# Patient Record
Sex: FEMALE | Race: BLACK OR AFRICAN AMERICAN | NOT HISPANIC OR LATINO | Employment: OTHER | URBAN - METROPOLITAN AREA
[De-identification: names, ages, dates, MRNs, and addresses within clinical notes are randomized per-mention and may not be internally consistent; named-entity substitution may affect disease eponyms.]

---

## 2020-05-22 ENCOUNTER — TELEPHONE (OUTPATIENT)
Dept: ORTHOPEDICS | Facility: CLINIC | Age: 60
End: 2020-05-22

## 2020-05-22 NOTE — TELEPHONE ENCOUNTER
----- Message from Diane Myers sent at 5/22/2020 10:04 AM CDT -----  Contact: Joselyn Alves would like to know if the registration paperwork for new patients can be mailed to her to complete. Please call [phone] to discuss.

## 2020-05-22 NOTE — TELEPHONE ENCOUNTER
Placed a call to the patient to reschedule the current appointment with Dr. Little to the [place] location. There was no answer therefore, a voicemail was left.

## 2020-05-22 NOTE — TELEPHONE ENCOUNTER
Placed a return call to the patient. There was no answer at given phone number. A voicemail was left stating that we, the clinic, do not have any paperwork. Everything is online. I stated that if the patient would like to complete her pre-questionnaire and build her chart then she can do so on the [facility]ore. The phone number, [phone], was left for the patient to call back with any other questions.

## 2020-05-25 ENCOUNTER — TELEPHONE (OUTPATIENT)
Dept: ORTHOPEDICS | Facility: CLINIC | Age: 60
End: 2020-05-25

## 2020-05-25 NOTE — TELEPHONE ENCOUNTER
Called patient to reschedule her appointment with Dr. Little due to his clinic days being changed. No answer at patient's phone and left voicemail for patient to call the office at phone number [phone] to reschedule the appointment.     Unable to contact patient X 2 attempts.     [facility] message sent.

## 2020-07-17 ENCOUNTER — HOSPITAL ENCOUNTER (OUTPATIENT)
Dept: RADIOLOGY | Facility: HOSPITAL | Age: 60
Discharge: HOME | End: 2020-07-17
Attending: ORTHOPAEDIC SURGERY
Payer: OTHER GOVERNMENT

## 2020-07-17 ENCOUNTER — OFFICE VISIT (OUTPATIENT)
Dept: ORTHOPEDICS | Facility: CLINIC | Age: 60
End: 2020-07-17
Payer: OTHER GOVERNMENT

## 2020-07-17 VITALS — OXYGEN SATURATION: 99 % | BODY MASS INDEX: 32.96 KG/M2 | HEART RATE: 85 BPM | HEIGHT: 63 IN | WEIGHT: 186 LBS

## 2020-07-17 DIAGNOSIS — M47.816 OSTEOARTHRITIS OF LUMBAR SPINE, UNSPECIFIED SPINAL OSTEOARTHRITIS COMPLICATION STATUS: ICD-10-CM

## 2020-07-17 DIAGNOSIS — M25.552 BILATERAL HIP PAIN: ICD-10-CM

## 2020-07-17 DIAGNOSIS — M70.61 GREATER TROCHANTERIC BURSITIS OF BOTH HIPS: Primary | ICD-10-CM

## 2020-07-17 DIAGNOSIS — M70.62 GREATER TROCHANTERIC BURSITIS OF BOTH HIPS: Primary | ICD-10-CM

## 2020-07-17 DIAGNOSIS — M25.551 BILATERAL HIP PAIN: Primary | ICD-10-CM

## 2020-07-17 DIAGNOSIS — M25.551 BILATERAL HIP PAIN: ICD-10-CM

## 2020-07-17 DIAGNOSIS — M25.552 BILATERAL HIP PAIN: Primary | ICD-10-CM

## 2020-07-17 PROCEDURE — 99204 OFFICE O/P NEW MOD 45 MIN: CPT | Mod: 25,S$GLB,, | Performed by: ORTHOPAEDIC SURGERY

## 2020-07-17 PROCEDURE — 99999 PR PBB SHADOW E&M-EST. PATIENT-LVL III: ICD-10-PCS | Mod: PBBFAC,,, | Performed by: ORTHOPAEDIC SURGERY

## 2020-07-17 PROCEDURE — 20610 DRAIN/INJ JOINT/BURSA W/O US: CPT | Mod: 50,S$GLB,, | Performed by: ORTHOPAEDIC SURGERY

## 2020-07-17 PROCEDURE — 73521 X-RAY EXAM HIPS BI 2 VIEWS: CPT | Mod: 26,,, | Performed by: RADIOLOGY

## 2020-07-17 PROCEDURE — 99999 PR PBB SHADOW E&M-EST. PATIENT-LVL III: CPT | Mod: PBBFAC,,, | Performed by: ORTHOPAEDIC SURGERY

## 2020-07-17 PROCEDURE — 73521 XR PELVIS 3 VIEW WITH INC HIP 2 VIEW BILAT: ICD-10-PCS | Mod: 26,,, | Performed by: RADIOLOGY

## 2020-07-17 PROCEDURE — 73521 X-RAY EXAM HIPS BI 2 VIEWS: CPT | Mod: TC,FY

## 2020-07-17 PROCEDURE — 99204 PR OFFICE/OUTPT VISIT, NEW, LEVL IV, 45-59 MIN: ICD-10-PCS | Mod: 25,S$GLB,, | Performed by: ORTHOPAEDIC SURGERY

## 2020-07-17 PROCEDURE — 20610 LARGE JOINT ASPIRATION/INJECTION: BILATERAL GREATER TROCHANTERIC BURSA: ICD-10-PCS | Mod: 50,S$GLB,, | Performed by: ORTHOPAEDIC SURGERY

## 2020-07-17 RX ORDER — ESCITALOPRAM OXALATE 5 MG/1
TABLET ORAL
COMMUNITY

## 2020-07-17 RX ORDER — BENAZEPRIL HYDROCHLORIDE AND HYDROCHLOROTHIAZIDE 20; 12.5 MG/1; MG/1
TABLET ORAL
COMMUNITY
Start: 2020-06-03

## 2020-07-17 RX ORDER — ALBUTEROL SULFATE 90 UG/1
AEROSOL, METERED RESPIRATORY (INHALATION)
COMMUNITY
Start: 2020-06-21

## 2020-07-17 RX ORDER — BLOOD SUGAR DIAGNOSTIC
STRIP MISCELLANEOUS
COMMUNITY
Start: 2020-06-02

## 2020-07-17 RX ORDER — BENAZEPRIL HYDROCHLORIDE AND HYDROCHLOROTHIAZIDE 20; 12.5 MG/1; MG/1
TABLET ORAL
COMMUNITY
Start: 2012-06-04

## 2020-07-17 RX ORDER — LANCETS
EACH MISCELLANEOUS
COMMUNITY
Start: 2020-06-02

## 2020-07-17 RX ORDER — BUPROPION HYDROCHLORIDE 300 MG/1
TABLET ORAL
COMMUNITY
Start: 2020-06-03

## 2020-07-17 RX ORDER — ALBUTEROL SULFATE 90 UG/1
AEROSOL, METERED RESPIRATORY (INHALATION)
COMMUNITY

## 2020-07-17 RX ORDER — TRIAMCINOLONE ACETONIDE 40 MG/ML
40 INJECTION, SUSPENSION INTRA-ARTICULAR; INTRAMUSCULAR
Status: DISCONTINUED | OUTPATIENT
Start: 2020-07-17 | End: 2020-07-17 | Stop reason: HOSPADM

## 2020-07-17 RX ORDER — CEPHALEXIN 250 MG/1
CAPSULE ORAL
COMMUNITY
Start: 2020-06-21

## 2020-07-17 RX ORDER — MODAFINIL 100 MG/1
TABLET ORAL
COMMUNITY
Start: 2020-06-16

## 2020-07-17 RX ORDER — TIZANIDINE 4 MG/1
TABLET ORAL
COMMUNITY
Start: 2020-06-15

## 2020-07-17 RX ORDER — PRAVASTATIN SODIUM 20 MG/1
TABLET ORAL
COMMUNITY

## 2020-07-17 RX ORDER — CELECOXIB 200 MG/1
1 CAPSULE ORAL
COMMUNITY

## 2020-07-17 RX ORDER — MONTELUKAST SODIUM 10 MG/1
TABLET ORAL
COMMUNITY

## 2020-07-17 RX ORDER — ESOMEPRAZOLE MAGNESIUM 40 MG/1
CAPSULE, DELAYED RELEASE ORAL
COMMUNITY
Start: 2020-05-26

## 2020-07-17 RX ORDER — LUBIPROSTONE 8 UG/1
1 CAPSULE ORAL
COMMUNITY

## 2020-07-17 RX ADMIN — TRIAMCINOLONE ACETONIDE 40 MG: 40 INJECTION, SUSPENSION INTRA-ARTICULAR; INTRAMUSCULAR at 01:07

## 2020-07-17 NOTE — PROCEDURES
Large Joint Aspiration/Injection: bilateral greater trochanteric bursa    Date/Time: 7/17/2020 1:00 PM  Performed by: Akhil Little DO  Authorized by: Akhil Little, DO     Consent Done?:  Yes (Verbal)  Indications:  Diagnostic evaluation and pain  Site marked: the procedure site was marked    Timeout: prior to procedure the correct patient, procedure, and site was verified    Prep: patient was prepped and draped in usual sterile fashion      Details:  Needle Size:  22 G  Ultrasonic Guidance for needle placement?: No    Approach:  Lateral  Location:  Hip  Laterality:  Bilateral  Site:  Bilateral greater trochanteric bursa  Medications (Right):  40 mg triamcinolone acetonide 40 mg/mL  Medications (Left):  40 mg triamcinolone acetonide 40 mg/mL  Patient tolerance:  Patient tolerated the procedure well with no immediate complications

## 2020-07-17 NOTE — PROGRESS NOTES
Subjective:      Patient ID: Joselyn Kilpatrick is a 59 y.o. female.    Chief Complaint: Pain of the Left Hip and Pain of the Right Hip    Referring Provider: Gala Mejias Md  [address] [place] [address]    HPI:   Ms. Kilpatrick is a 59-year-old lady who presented today for evaluation of approximately 2 years of bilateral hip pain, right greater than left which began in  she is unsure of the exact date.  Her symptoms have increased over the last couple of years she denied trauma.  There is no specific motion which cause the pain and sometimes the pain radiates to her ankle and low back.  It is a burning type of pain.  She has taken NSAIDs with help but she had to stop taking them because they were causing tinitus. She has done physical therapy of which she is doing it now it helps.  She has had injections in the past with help the last 1 was on May 28th but her symptoms recurred quickly.    Past medical history:  Pre diabetes   Hypertension  Hyperlipidemia  Seasonal allergies  Asthma  Depression  Anxiety  GERD  Constipation  Headaches  Hiatal hernia  Sickle cell trait     Past surgical history   T&A  Cholecystectomy  Tubal ligation  Hysterectomy  EGD  Colonoscopy  C-spine fusion    Review of patient's allergies indicates:   Allergen Reactions    Dextroamphetamine-amphetamine Palpitations    Lisdexamfetamine Palpitations       Social History     Occupational History     retired    Tobacco Use    Smoking status:  denied tobacco use   Substance and Sexual Activity    Alcohol use:  social ethanol use    Drug use:  denied illicit drug use    Sexual activity:  Heterosexual      Family history:   Father: , MI.    Mother:  , pulmonary embolus.    Brother:  Both , MI/ DM.    Sister:  3, alive, coronary artery disease / DM.    Son:  1, alive, denied medical problems.    Previous Hospitalizations:  Childbirth.    ROS:   Review of Systems   Constitution: Negative  for chills and fever.   HENT: Negative for congestion.    Eyes: Negative for blurred vision.   Cardiovascular: Negative for chest pain.   Respiratory: Negative for cough.    Endocrine: Negative for polydipsia.   Skin: Negative for flushing and itching.   Musculoskeletal: Negative for gout.   Gastrointestinal: Positive for constipation and heartburn. Negative for diarrhea.   Genitourinary: Negative for nocturia.   Neurological: Positive for headaches. Negative for seizures.   Psychiatric/Behavioral: Positive for depression. Negative for substance abuse. The patient is nervous/anxious.    Allergic/Immunologic: Positive for environmental allergies.           Objective:      Physical Exam:   General: AAOx3.  No acute distress  HEENT: Normocephalic, PEARLA EOMI,  Fair dentition  Neck: Supple, No JVD  Chest: Symetric, equal excursion on inspiration  Abdomen: Soft NTND  Vascular:  Pulses intact and equal bilaterally.  Capillary refill less than 3 seconds and equal bilaterally  Neurologic:  Pinprick and soft touch intact and equal bilaterally  Negative straight leg raising bilaterally.   Integment:  No ecchymosis, no errythema  Extremity:  Hip:  Forward flexion/ backward flexion equal bilaterally greater than 95°/ 10°.  Internal / external rotation equal bilaterally.  Jm/fabere /logroll / push-pull negative both hips.  Tender over the greater trochanter both sides. Luc's  Minimally positive both hips.  Nontender with groin palpation.                       Lumbosacral spine:  Forward flexion / backward flexion 55/15 degrees.  Right / left rotation 45/45 degrees.  Right /left side bending 25/25 degrees.  Minimal tenderness with palpation lumbosacral spine.  Radiography:  Personally reviewed x-rays which include AP pelvis and bilateral hips completed on 07/17/2020 showed lumbosacral DJD no fracture dislocation.  Good joint spacing of the femoral acetabular articulation.       Assessment:       Impression:      1. Greater  trochanteric bursitis of both hips    2. Osteoarthritis of lumbar spine, unspecified spinal osteoarthritis complication status          Plan:       1.  Discussed physical examination and radiographic findings with the patient. Joselyn understands that she has Chronic greater trochanteric bursitis.  Treatment alternatives and outcomes were discussed with the patient.  She understands she could continue with conservative management such as NSAIDs, activity modification, physical therapy, injections, or she could consider surgical intervention of which a greater trochanteric bursectomy either endoscopic later open could be done.  She states she would prefer to trial some more conservative management before considering surgery.    2. Offered a steroid injection to the greater trochanteric bursa of both hips, she elected to proceed.    3. Would not recommend she take NSAIDs as she has a history of developing tinnitus  4. Offered referred to physical therapy she declined for now.  5. Home exercises to include IT band stretching were shown discussed.    6. Ambulate as tolerated but avoid inciting activities.  7. [facility] portal was discussed with the patient and information was given.  She was encouraged to use the portal for future encounters.    8. Follow up p.r.n..  If she has not improved by next visit may discuss possible bursectomy at that time.

## 2020-07-17 NOTE — LETTER
July 17, 2020      Gala Mejias MD  [address]  [place] [address]           [facility]  [address] [address]  Phone: [phone]  Fax: [phone]          Patient: Joselyn Kilpatrick   MR Number: [id number]   YOB: 1960   Date of Visit: 7/17/2020       Dear Dr. Gala Mejias:    Thank you for referring Joselyn Kilpatrick to me for evaluation. Attached you will find relevant portions of my assessment and plan of care.    If you have questions, please do not hesitate to call me. I look forward to following Joselyn Kilpatrick along with you.    Sincerely,    Akhil Little, DO    Enclosure  CC:  No Recipients    If you would like to receive this communication electronically, please contact [email or web address] or [phone] to request more information on [facility] access.    For providers and/or their staff who would like to refer a patient to [facility], please contact us through our one-stop-shop provider referral line, [facility], at [phone].    If you feel you have received this communication in error or would no longer like to receive these types of communications, please e-mail [email or web address]

## 2020-10-30 ENCOUNTER — OFFICE VISIT (OUTPATIENT)
Dept: ORTHOPEDICS | Facility: CLINIC | Age: 60
End: 2020-10-30
Payer: MEDICARE

## 2020-10-30 VITALS
HEIGHT: 63 IN | WEIGHT: 186.06 LBS | BODY MASS INDEX: 32.97 KG/M2 | HEART RATE: 73 BPM | OXYGEN SATURATION: 99 % | TEMPERATURE: 98 F

## 2020-10-30 DIAGNOSIS — M70.62 GREATER TROCHANTERIC BURSITIS OF BOTH HIPS: Primary | ICD-10-CM

## 2020-10-30 DIAGNOSIS — M70.61 GREATER TROCHANTERIC BURSITIS OF BOTH HIPS: Primary | ICD-10-CM

## 2020-10-30 PROCEDURE — 99999 PR PBB SHADOW E&M-EST. PATIENT-LVL IV: ICD-10-PCS | Mod: PBBFAC,,, | Performed by: ORTHOPAEDIC SURGERY

## 2020-10-30 PROCEDURE — 99213 PR OFFICE/OUTPT VISIT, EST, LEVL III, 20-29 MIN: ICD-10-PCS | Mod: 25,S$PBB,, | Performed by: ORTHOPAEDIC SURGERY

## 2020-10-30 PROCEDURE — 99214 OFFICE O/P EST MOD 30 MIN: CPT | Mod: PBBFAC | Performed by: ORTHOPAEDIC SURGERY

## 2020-10-30 PROCEDURE — 99999 PR PBB SHADOW E&M-EST. PATIENT-LVL IV: CPT | Mod: PBBFAC,,, | Performed by: ORTHOPAEDIC SURGERY

## 2020-10-30 PROCEDURE — 99213 OFFICE O/P EST LOW 20 MIN: CPT | Mod: 25,S$PBB,, | Performed by: ORTHOPAEDIC SURGERY

## 2020-10-30 PROCEDURE — 20610 LARGE JOINT ASPIRATION/INJECTION: BILATERAL HIP JOINT: ICD-10-PCS | Mod: 50,S$PBB,, | Performed by: ORTHOPAEDIC SURGERY

## 2020-10-30 PROCEDURE — 20610 DRAIN/INJ JOINT/BURSA W/O US: CPT | Mod: 50,PBBFAC | Performed by: ORTHOPAEDIC SURGERY

## 2020-10-30 RX ORDER — TRIAMCINOLONE ACETONIDE 40 MG/ML
40 INJECTION, SUSPENSION INTRA-ARTICULAR; INTRAMUSCULAR
Status: DISCONTINUED | OUTPATIENT
Start: 2020-10-30 | End: 2020-10-30 | Stop reason: HOSPADM

## 2020-10-30 RX ADMIN — TRIAMCINOLONE ACETONIDE 40 MG: 40 INJECTION, SUSPENSION INTRA-ARTICULAR; INTRAMUSCULAR at 10:10

## 2020-10-30 NOTE — PROCEDURES
Large Joint Aspiration/Injection: bilateral hip joint    Date/Time: 10/30/2020 10:00 AM  Performed by: Akhil Little DO  Authorized by: Akhil Little, DO     Consent Done?:  Yes (Verbal)  Indications:  Diagnostic evaluation and pain  Site marked: the procedure site was marked    Timeout: prior to procedure the correct patient, procedure, and site was verified    Prep: patient was prepped and draped in usual sterile fashion      Details:  Needle Size:  22 G  Ultrasonic Guidance for needle placement?: No    Approach:  Lateral  Location:  Hip  Laterality:  Bilateral  Site:  Bilateral hip joint  Medications (Right):  40 mg triamcinolone acetonide 40 mg/mL  Medications (Left):  40 mg triamcinolone acetonide 40 mg/mL  Patient tolerance:  Patient tolerated the procedure well with no immediate complications

## 2020-10-30 NOTE — PROGRESS NOTES
Subjective:      Patient ID: Joselyn Kilpatrick is a 59 y.o. female.    Chief Complaint: Pain of the Right Hip and Pain of the Left Hip      HPI:  Ms. Kilpatrick returns today with complaints of recurrent pain in both her hips, right greater than left.  At her last visit on 07/17/2020 she was given a steroid injection to the greater trochanteric bursa of both hips and had good resolution of pain in till approximately 1 and half to 2 weeks ago where her pain began to recur.  Now if she lays on her hips it causes her pain.  Ambulation causes her pain.  Her pain awakens her at night.  She has taken NSAIDs and used Voltaren gel which is not helping.  She has been doing home exercises which do help.    ROS:  No new diagnosis/surgery/prescriptions since last office visit on 07/17/2020.  Constitution: Negative for chills and fever.   HENT: Negative for congestion.    Eyes: Negative for blurred vision.   Cardiovascular: Negative for chest pain.   Respiratory: Negative for cough.    Endocrine: Negative for polydipsia.   Skin: Negative for flushing and itching.   Musculoskeletal: Negative for gout.   Gastrointestinal: Positive for constipation and heartburn. Negative for diarrhea.   Genitourinary: Negative for nocturia.   Neurological: Positive for headaches. Negative for seizures.   Psychiatric/Behavioral: Positive for depression. Negative for substance abuse. The patient is nervous/anxious.    Allergic/Immunologic: Positive for environmental allergies.       Objective:      Physical Exam:   General: AAOx3.  No acute distress  Vascular:  Pulses intact and equal bilaterally.  Capillary refill less than 3 seconds and equal bilaterally  Neurologic:  Pinprick and soft touch intact and equal bilaterally  Integment:  No ecchymosis, no errythema  Extremity: Hip:  Forward flexion/ backward flexion equal bilaterally greater than 95°/ 10°.  Internal / external rotation equal bilaterally.  Jm/fabere /logroll / push-pull negative both hips.   Tender over the greater trochanter both sides. Luc's  Minimally positive both hips.  Nontender with groin palpation.    Radiography:  No new x-rays done today.      Assessment:       Impression:  Greater trochanteric bursitis, both hips.      Plan:       1.  Discussed physical examination with the patient. Joselyn understands that she has recurrent greater trochanteric bursitis of both of her hips.  Treatment alternatives and outcomes were discussed with the patient she understands she could continue with conservative management such as observation, activity modification, NSAIDs, physical therapy, injections, or she could consider surgical intervention such as endoscopic greater trochanteric bursectomy versus open greater trochanteric bursectomy.  She stated she would prefer to continue with conservative management.  2.  Offered a steroid injection to the greater trochanteric bursa of both hips, she elected to proceed.  3.  Home exercises to include IT band stretching were reinforced.  4.  Offered referred to physical therapy she declined.  5.  Continue with NSAIDs as tolerated.  6.  Continue with Voltaren gel.  7.  Follow up p.r.n..

## 2020-11-02 ENCOUNTER — PATIENT MESSAGE (OUTPATIENT)
Dept: ORTHOPEDICS | Facility: CLINIC | Age: 60
End: 2020-11-02